# Patient Record
Sex: FEMALE | Race: ASIAN | NOT HISPANIC OR LATINO | URBAN - METROPOLITAN AREA
[De-identification: names, ages, dates, MRNs, and addresses within clinical notes are randomized per-mention and may not be internally consistent; named-entity substitution may affect disease eponyms.]

---

## 2017-08-01 ENCOUNTER — OFFICE VISIT (OUTPATIENT)
Dept: OBSTETRICS AND GYNECOLOGY | Facility: CLINIC | Age: 32
End: 2017-08-01
Attending: OBSTETRICS & GYNECOLOGY
Payer: COMMERCIAL

## 2017-08-01 VITALS
BODY MASS INDEX: 22.93 KG/M2 | SYSTOLIC BLOOD PRESSURE: 110 MMHG | DIASTOLIC BLOOD PRESSURE: 72 MMHG | HEIGHT: 63 IN | WEIGHT: 129.44 LBS

## 2017-08-01 DIAGNOSIS — Z01.419 ENCOUNTER FOR GYNECOLOGICAL EXAMINATION WITHOUT ABNORMAL FINDING: Primary | ICD-10-CM

## 2017-08-01 DIAGNOSIS — Z12.4 PAP SMEAR FOR CERVICAL CANCER SCREENING: ICD-10-CM

## 2017-08-01 PROCEDURE — 99999 PR PBB SHADOW E&M-NEW PATIENT-LVL III: CPT | Mod: PBBFAC,,, | Performed by: OBSTETRICS & GYNECOLOGY

## 2017-08-01 PROCEDURE — 99385 PREV VISIT NEW AGE 18-39: CPT | Mod: S$GLB,,, | Performed by: OBSTETRICS & GYNECOLOGY

## 2017-08-01 PROCEDURE — 88175 CYTOPATH C/V AUTO FLUID REDO: CPT

## 2017-08-01 NOTE — PROGRESS NOTES
"  Subjective:       Patient ID: Alo Dover is a 32 y.o. female.    Chief Complaint:  Well Woman      History of Present Illness  HPI  Alo Dover is a 32 y.o. female  NEW TO ME  here for her annual GYN exam.    She describes her periods as regular, normal flow, lasting 5 days.   Denies break through bleeding.   Denies vaginal itching or irritation.  Denies vaginal discharge.  She is sexually active. She has had 1 partner for the past 3 years .  She uses condoms for contraception.   History of abnormal pap: No  Last Pap: approximate date  and was normal  Denies domestic violence. She does feel safe at home.     History reviewed. No pertinent past medical history.  History reviewed. No pertinent surgical history.  Social History     Social History    Marital status:      Spouse name: N/A    Number of children: N/A    Years of education: N/A     Occupational History    Not on file.     Social History Main Topics    Smoking status: Never Smoker    Smokeless tobacco: Never Used    Alcohol use No    Drug use: No    Sexual activity: Yes     Partners: Male     Birth control/ protection: None, Condom      Comment:  since      Other Topics Concern    Not on file     Social History Narrative    No narrative on file     Family History   Problem Relation Age of Onset    Colon cancer Paternal Grandfather 70    Hypertension Father     Hypertension Mother     Diabetes Paternal Grandmother     Breast cancer Neg Hx     Ovarian cancer Neg Hx      OB History      Para Term  AB Living    0 0 0 0 0 0    SAB TAB Ectopic Multiple Live Births    0 0 0 0 0          /72   Ht 5' 3" (1.6 m)   Wt 58.7 kg (129 lb 6.6 oz)   LMP 2017   BMI 22.92 kg/m²         GYN & OB History  Patient's last menstrual period was 2017.   Date of Last Pap: No result found    OB History    Para Term  AB Living   0 0 0 0 0 0   SAB TAB Ectopic Multiple Live Births   0 " 0 0 0 0             Review of Systems  Review of Systems   Constitutional: Negative for activity change, appetite change, fatigue and unexpected weight change.   HENT: Negative.    Eyes: Negative for visual disturbance.   Respiratory: Negative for shortness of breath and wheezing.    Cardiovascular: Negative for chest pain, palpitations and leg swelling.   Gastrointestinal: Negative for abdominal pain, bloating and blood in stool.   Endocrine: Negative for diabetes and hair loss.   Genitourinary: Negative for decreased libido, dyspareunia and menstrual problem.   Musculoskeletal: Negative for back pain and joint swelling.   Skin:  Negative for no acne and hair changes.   Neurological: Negative for headaches.   Hematological: Does not bruise/bleed easily.   Psychiatric/Behavioral: Negative for depression and sleep disturbance. The patient is not nervous/anxious.    Breast: Negative for breast pain and nipple discharge          Objective:    Physical Exam:   Constitutional: She is oriented to person, place, and time. She appears well-developed and well-nourished.    HENT:   Head: Normocephalic and atraumatic.    Eyes: EOM are normal. Pupils are equal, round, and reactive to light.    Neck: Normal range of motion. Neck supple.    Cardiovascular: Normal rate and regular rhythm.     Pulmonary/Chest: Effort normal and breath sounds normal.   BREASTS: Symmetrical, no skin changes or visible lesions.  No palpable masses, nipple discharge bilaterally.          Abdominal: Soft. Bowel sounds are normal.     Genitourinary: Pelvic exam was performed with patient supine.   Genitourinary Comments: PELVIC: Normal external genitalia without lesions.  Normal hair distribution.  Adequate perineal body, normal urethral meatus.  Vagina moist and well rugated without lesions or discharge.  Cervix pink, without lesions, discharge or tenderness.  No significant cystocele or rectocele.  Bimanual exam shows uterus to be normal size, regular,  mobile and nontender.  Adnexa without masses or tenderness.               Musculoskeletal: Normal range of motion and moves all extremeties.       Neurological: She is alert and oriented to person, place, and time.    Skin: Skin is warm and dry.    Psychiatric: She has a normal mood and affect.          Assessment:        1. Encounter for gynecological examination without abnormal finding    2. Pap smear for cervical cancer screening                Plan:      1. Encounter for gynecological examination without abnormal finding  COUNSELING:  The patient was counseled today on regular weight bearing exercise. Counseled on optimal timing for pregnancy, rubella screen, cystic fibrosis carrier screening, decreased alcohol and caffeine consumption, and decreased intake of seafood most likely to contain mercury.  Recommend daily 800mcg of folic acid.  The patient was also counseled today on ACS PAP guidelines, with recommendations for yearly pelvic exams unless their uterus, cervix, and ovaries were removed for benign reasons; in that case, examinations every 3-5 years are recommended. The patient was also counseled regarding monthly breast self-examination, routine STD screening for at-risk populations, prophylactic immunizations for transmitted infections such as HPV, Pertussis, or Influenza as appropriate, and yearly mammograms when indicated by ACS guidelines. She was advised to see her primary care physician for all other health maintenance.   FOLLOW-UP with me for next routine visit.         2. Pap smear for cervical cancer screening    - Liquid-based pap smear, screening       Return in about 1 year (around 8/1/2018).

## 2017-08-09 NOTE — LETTER
August 9, 2017    Alo Dover  [address]  [facility] [address]             [facility] 5th Floor  [address]  [facility] [address]  Phone: [phone] Dear Ms. Alo Dover:    Below are the results from your recent visit:    Your PAP smear result is within the normal range.  It is recommended that you have a repeat PAP smear in 3 years.  This is accordance with the newest American Cancer Society guidelines for cervical screening.  However, the PAP smear should not be confused with your need for a routine gynecologic exam, which you will still need every year.      I also recommend monthly breast self-examination, routine STD screening if you feel that you may be at-risk for an exposure to infection, prophylactic immunizations for transmitted infections such as  HPV, Pertussis, or Influenza as appropriate, and yearly mammograms at age 40 for low-risk patients, as indicated by ACS guidelines.     It has been a pleasure working with you to assure your best possible health.  Please contact me if you have any questions or concerns.    Your results are normal.  Please don't hesitate to call our office if you have any questions or concerns.      Sincerely,    Perla Christine MD

## 2017-08-09 NOTE — LETTER
August 9, 2017    Alo Dover  [address]  [facility] [address]             [facility] 5th Floor  [address]  [facility] [address]  Phone: [phone] Dear Ms. Alo Dover:    Below are the results from your recent visit:    Your PAP smear result is within the normal range.  It is recommended that you have a repeat PAP smear in 3 years.  This is accordance with the newest American Cancer Society guidelines for cervical screening.  However, the PAP smear should not be confused with your need for a routine gynecologic exam, which you will still need every year.      I also recommend monthly breast self-examination, routine STD screening if you feel that you may be at-risk for an exposure to infection, prophylactic immunizations for transmitted infections such as  HPV, Pertussis, or Influenza as appropriate, and yearly mammograms at age 40 for low-risk patients, as indicated by ACS guidelines.     It has been a pleasure working with you to assure your best possible health.  Please contact me if you have any questions or concerns.    Your results are normal.  Please don't hesitate to call our office if you have any questions or concerns.      Sincerely,    Perla Christine MD

## 2017-08-09 NOTE — ADDENDUM NOTE
Encounter addended by: Perla Christine MD on: 8/9/2017 10:56 AM<BR>    Actions taken: Letter status changed